# Patient Record
Sex: MALE | Employment: OTHER | URBAN - METROPOLITAN AREA
[De-identification: names, ages, dates, MRNs, and addresses within clinical notes are randomized per-mention and may not be internally consistent; named-entity substitution may affect disease eponyms.]

---

## 2017-01-31 PROBLEM — J32.1 CHRONIC FRONTAL SINUSITIS: Status: ACTIVE | Noted: 2017-01-31

## 2017-01-31 PROBLEM — J34.2 NASAL SEPTAL DEVIATION: Status: ACTIVE | Noted: 2017-01-31

## 2017-01-31 PROBLEM — K22.2 SCHATZKI'S RING OF DISTAL ESOPHAGUS: Status: ACTIVE | Noted: 2017-01-31

## 2017-01-31 PROBLEM — J32.2 CHRONIC ETHMOIDAL SINUSITIS: Status: ACTIVE | Noted: 2017-01-31

## 2017-01-31 PROBLEM — R13.14 PHARYNGOESOPHAGEAL DYSPHAGIA: Status: ACTIVE | Noted: 2017-01-31

## 2017-01-31 PROBLEM — J32.0 CHRONIC MAXILLARY SINUSITIS: Status: ACTIVE | Noted: 2017-01-31

## 2019-05-10 PROCEDURE — 87086 URINE CULTURE/COLONY COUNT: CPT | Performed by: PHYSICIAN ASSISTANT

## 2019-05-10 PROCEDURE — 81015 MICROSCOPIC EXAM OF URINE: CPT | Performed by: PHYSICIAN ASSISTANT

## 2019-06-11 PROBLEM — Z95.5 STATUS POST INSERTION OF DRUG-ELUTING STENT INTO LEFT ANTERIOR DESCENDING (LAD) ARTERY: Status: ACTIVE | Noted: 2019-06-11

## 2019-06-11 PROBLEM — I10 ESSENTIAL HYPERTENSION: Status: ACTIVE | Noted: 2019-06-11

## 2019-06-11 PROBLEM — I25.10 CORONARY ARTERY DISEASE INVOLVING NATIVE CORONARY ARTERY OF NATIVE HEART WITHOUT ANGINA PECTORIS: Status: ACTIVE | Noted: 2019-06-11

## 2019-06-11 PROBLEM — I50.22 CHRONIC SYSTOLIC HEART FAILURE (HCC): Status: ACTIVE | Noted: 2019-06-11

## 2019-06-18 PROBLEM — D64.9 ANEMIA: Status: ACTIVE | Noted: 2019-06-18

## 2019-07-02 PROBLEM — R31.0 GROSS HEMATURIA: Status: ACTIVE | Noted: 2019-07-02

## 2019-11-08 PROBLEM — I25.5 ISCHEMIC CARDIOMYOPATHY: Status: ACTIVE | Noted: 2019-11-08

## 2020-09-01 PROCEDURE — 88108 CYTOPATH CONCENTRATE TECH: CPT | Performed by: PHYSICIAN ASSISTANT

## 2020-10-01 PROCEDURE — 88305 TISSUE EXAM BY PATHOLOGIST: CPT | Performed by: INTERNAL MEDICINE

## 2020-12-01 PROBLEM — Z87.448 HISTORY OF GROSS HEMATURIA: Status: ACTIVE | Noted: 2020-12-01

## 2020-12-01 PROBLEM — Z87.898 HISTORY OF GROSS HEMATURIA: Status: ACTIVE | Noted: 2020-12-01

## 2021-03-03 PROBLEM — I77.1 TORTUOUS AORTA (HCC): Status: ACTIVE | Noted: 2021-03-03

## 2021-03-03 PROBLEM — J32.0 CHRONIC MAXILLARY SINUSITIS: Status: RESOLVED | Noted: 2017-01-31 | Resolved: 2021-03-03

## 2021-03-03 PROBLEM — J32.2 CHRONIC ETHMOIDAL SINUSITIS: Status: RESOLVED | Noted: 2017-01-31 | Resolved: 2021-03-03

## 2021-03-03 PROBLEM — R13.14 PHARYNGOESOPHAGEAL DYSPHAGIA: Status: RESOLVED | Noted: 2017-01-31 | Resolved: 2021-03-03

## 2021-03-03 PROBLEM — I77.1 TORTUOUS AORTA: Status: ACTIVE | Noted: 2021-03-03

## 2021-03-03 PROBLEM — E66.01 OBESITY, MORBID (HCC): Status: ACTIVE | Noted: 2021-03-03

## 2021-03-03 PROBLEM — E66.812 CLASS 2 SEVERE OBESITY DUE TO EXCESS CALORIES WITH SERIOUS COMORBIDITY AND BODY MASS INDEX (BMI) OF 36.0 TO 36.9 IN ADULT (HCC): Status: ACTIVE | Noted: 2021-03-03

## 2021-03-03 PROBLEM — R31.0 GROSS HEMATURIA: Status: RESOLVED | Noted: 2019-07-02 | Resolved: 2021-03-03

## 2021-03-03 PROBLEM — Z87.448 HISTORY OF GROSS HEMATURIA: Status: RESOLVED | Noted: 2020-12-01 | Resolved: 2021-03-03

## 2021-03-03 PROBLEM — I26.99 PE (PULMONARY THROMBOEMBOLISM) (HCC): Status: ACTIVE | Noted: 2021-03-03

## 2021-03-03 PROBLEM — E66.01 CLASS 2 SEVERE OBESITY DUE TO EXCESS CALORIES WITH SERIOUS COMORBIDITY AND BODY MASS INDEX (BMI) OF 36.0 TO 36.9 IN ADULT (HCC): Status: ACTIVE | Noted: 2021-03-03

## 2021-03-03 PROBLEM — Z87.898 HISTORY OF GROSS HEMATURIA: Status: RESOLVED | Noted: 2020-12-01 | Resolved: 2021-03-03

## 2021-03-03 PROBLEM — J32.1 CHRONIC FRONTAL SINUSITIS: Status: RESOLVED | Noted: 2017-01-31 | Resolved: 2021-03-03

## 2021-03-03 PROBLEM — E66.01 CLASS 2 SEVERE OBESITY DUE TO EXCESS CALORIES WITH SERIOUS COMORBIDITY AND BODY MASS INDEX (BMI) OF 36.0 TO 36.9 IN ADULT: Status: ACTIVE | Noted: 2021-03-03

## 2022-03-04 PROBLEM — E66.01 OBESITY, MORBID (HCC): Status: RESOLVED | Noted: 2021-03-03 | Resolved: 2022-03-04

## 2022-03-04 PROBLEM — E66.9 OBESITY (BMI 30-39.9): Status: ACTIVE | Noted: 2022-03-04

## 2022-03-04 PROBLEM — I70.8 AORTO-ILIAC ATHEROSCLEROSIS: Status: ACTIVE | Noted: 2022-03-04

## 2022-03-04 PROBLEM — I70.0 AORTO-ILIAC ATHEROSCLEROSIS: Status: ACTIVE | Noted: 2022-03-04

## 2022-03-10 PROBLEM — I11.0 HYPERTENSIVE HEART DISEASE WITH CONGESTIVE HEART FAILURE (HCC): Status: ACTIVE | Noted: 2022-03-10

## 2022-06-02 RX ORDER — TAMSULOSIN HYDROCHLORIDE 0.4 MG/1
CAPSULE ORAL
Qty: 90 CAPSULE | Refills: 3 | OUTPATIENT
Start: 2022-06-02

## 2025-01-24 NOTE — H&P
[facility]  Division of Cardiology  Updated H&P       Patient Name: Adin Salcido  MRN: [id number]  CSN: [id number]  YOB: 1950    Diagnosis: CAD, abnormal stress    Present Illness: Symptoms, CAD, abnormal stress test.  See EMR.    Interval change: None.    Home Medications:  Prescriptions Prior to Admission[1]    Allergies: Allergies[2]    History:  Past Medical History:    Allergic rhinitis    Arthritis    BPH (benign prostatic hyperplasia)    Chronic maxillary sinusitis    Resolved last addressed  11/7/17    Essential hypertension    Hematuria    History of adverse reaction to anesthesia    hallucinations/sensory overload with deep sedation    Nephrolithiasis    DANDY (obstructive sleep apnea)    AHI 30 RDI 37 REM AHI 54 Supine AHI 44 non-supine AHI 23 Sao2 [place] 83%/CPAP-12cwp/[facility]    Osteoarthrosis, unspecified whether generalized or localized, ankle and foot    Log Date: 06/02/2011     Osteoarthrosis, unspecified whether generalized or localized, lower leg    Log Date: 05/09/2013     Pharyngoesophageal dysphagia    Resolved last addressed  11/7/17     Sleep apnea    Good louise PSG  4/2012 AHI 45, cpap 15    Trigger finger (acquired)    .Log Date: 05/06/2013 Resolved last addressed  10/28/13      Past Surgical History:   Procedure Laterality Date    Biopsy of prostate,incisional      Colonoscopy  age 61    Cystourethroscopy,biopsy  05/09/2019    bladder mass - [facility] pathology proved to be prostate regrowth - [facility]    Egd N/A 10/1/2020    Procedure: ESOPHAGOGASTRODUODENOSCOPY, COLONOSCOPY, POSSIBLE BIOPSY, POSSIBLE POLYPECTOMY 15929, 33091;  Surgeon: Selvin Gray MD;  Location: [facility] SURGICAL [facility]    Hand/finger surgery unlisted      trigger release    Lithotripsy  2013    Dr. Baird    Other surgical history      nose and jaw surgery 2016    Other surgical history  09/03/2020    Cystoscopy, Dr Yarbrough    Sinus surgery        Transurethral elec-surg  prostatectom  04/20/2012    FILI Baird     Social History     Tobacco Use    Smoking status: Never    Smokeless tobacco: Never   Substance Use Topics    Alcohol use: Yes     Alcohol/week: 0.0 standard drinks of alcohol     Family History   Problem Relation Age of Onset    Cancer Father     Ear Problems Mother     Cancer Mother     Allergies Neg        Objective:  Vitals:    02/07/25 1015   BP: 137/79   Pulse: 84   Resp: 16   Temp: 96.9 °F (36.1 °C)       Exam:  General: NAD  Neck: No JVD  Lungs: CTA bilat  Heart: RRR, S1, S2  Abdomen: Soft, NT/ND, BS+x4  Extremities: Warm, dry, no LE edema bilat  Pulses: 2+ bilat DP  Skin: no rashes or legions noted  Neurological:  AAOx3, MAEW    Labs:             Plan:  Cardiac catheterization, coronary angio, +/- PCI.  Further recommendations after above.    Informed Consent:  I've discussed the procedure at length with the patient including the risks, benefits, and alternatives.  They wish to proceed.  I've reviewed the H&P and any changes within the last 30 days are noted above.    Thank you for allowing our group to care for your patient. Please contact me with any questions!   Fransisco Zhou MD  Ph [phone]                [1]   Medications Prior to Admission   Medication Sig Dispense Refill Last Dose/Taking    losartan 50 MG Oral Tab Take 1 tablet (50 mg total) by mouth daily.   2/7/2025 Morning    traZODone 50 MG Oral Tab Take by mouth nightly. 50-100mg   2/6/2025    sertraline 50 MG Oral Tab Take 1 tablet (50 mg total) by mouth daily.   2/6/2025    albuterol 108 (90 Base) MCG/ACT Inhalation Aero Soln Inhale 2 puffs into the lungs every 6 (six) hours as needed for Wheezing.   Past Month    ezetimibe 10 MG Oral Tab Take 1 tablet (10 mg total) by mouth nightly.   2/7/2025 Morning    omeprazole 20 MG Oral Capsule Delayed Release Take 1 capsule (20 mg total) by mouth before breakfast. 90 capsule 3 2/7/2025 Morning    rosuvastatin 40 MG Oral Tab TAKE 1 TABLET EVERY NIGHT 90  tablet 3 2/7/2025 Morning    metoprolol succinate ER 50 MG Oral Tablet 24 Hr Take 1 tablet (50 mg total) by mouth once daily. 90 tablet 3 2/7/2025 Morning    FINASTERIDE 5 MG Oral Tab TAKE 1 TABLET EVERY DAY 90 tablet 3 2/7/2025 Morning    aspirin EC 81 MG Oral Tab EC Take 1 tablet (81 mg total) by mouth daily. 90 tablet 3 2/7/2025 Morning   [2]   Allergies  Allergen Reactions    Penicillin G RASH

## 2025-01-30 RX ORDER — ALBUTEROL SULFATE 90 UG/1
2 INHALANT RESPIRATORY (INHALATION) EVERY 6 HOURS PRN
COMMUNITY

## 2025-01-30 RX ORDER — EZETIMIBE 10 MG/1
10 TABLET ORAL NIGHTLY
COMMUNITY

## 2025-01-31 ENCOUNTER — HOSPITAL ENCOUNTER (OUTPATIENT)
Dept: INTERVENTIONAL RADIOLOGY/VASCULAR | Facility: HOSPITAL | Age: 75
Discharge: HOME | End: 2025-01-31
Attending: INTERNAL MEDICINE
Payer: MEDICARE

## 2025-02-03 RX ORDER — TRAZODONE HYDROCHLORIDE 50 MG/1
TABLET ORAL NIGHTLY
COMMUNITY

## 2025-02-03 RX ORDER — LOSARTAN POTASSIUM 50 MG/1
50 TABLET ORAL DAILY
COMMUNITY

## 2025-02-03 NOTE — PAT NURSING NOTE
Per PAT encounter/MyChart message sent to pt/took notes as well:    PreOp Instructions     You are scheduled for: a Cardiac Procedure     Date of Procedure: 02/07/25 Friday     Diet Instructions: Do not eat or drink anything after midnight including gum, mints, candy, etc.     Medications: Medications you are allowed to take can be taken with a sip of water the morning of your procedure, Take Aspirin 81 mg x 4 tablets the day of your procedure     Medications to Stop: Hold herbal supplements and vitamins    Sleep Apnea: If you have sleep apnea, please bring your mask and tubing     Skin Prep : Shower with antibacterial soap using a clean washcloth, prior to procedure. Once dried off, no lotions/powders/creams/ointments, etc., Do not shave the procedure area, this will be completed at the hospital during the preparation phase.     Arrival Time: The day prior to your procedure (Thursday) you will receive a phone call before 6:00 pm with your arrival time. If you haven't received a phone call, please check your voicemail messages., If you did not receive a voice mail and it is after 6:00 pm, please call the nursing supervisor at [phone].    Driving After Procedure: Sedation will be given so you WILL NOT be able to drive home. You will need a responsible adult  to drive you home. You can NOT take uber or taxi unless approved by your physician in advance.     Discharge Teaching: Your nurse will give you specific instructions before discharge, Most people can resume normal activities in 2-3 days, Any questions, please call the physician's office     [employer] parking is available starting at 6 am or [facility]. Check in at the [facility] reception desk. Our  will be there to check you in for your procedure. Please bring your insurance cards and ID with you.                                                                                                                                       Please DO NOT respond to this message, the inbasket is not monitored for messages. For any questions, please call the physician's office.

## 2025-02-07 ENCOUNTER — HOSPITAL ENCOUNTER (OUTPATIENT)
Dept: INTERVENTIONAL RADIOLOGY/VASCULAR | Facility: HOSPITAL | Age: 75
Discharge: HOME | End: 2025-02-07
Attending: INTERNAL MEDICINE | Admitting: INTERNAL MEDICINE
Payer: MEDICARE

## 2025-02-07 VITALS
BODY MASS INDEX: 38.41 KG/M2 | OXYGEN SATURATION: 97 % | HEART RATE: 71 BPM | RESPIRATION RATE: 20 BRPM | HEIGHT: 64 IN | SYSTOLIC BLOOD PRESSURE: 110 MMHG | DIASTOLIC BLOOD PRESSURE: 59 MMHG | TEMPERATURE: 97 F | WEIGHT: 225 LBS

## 2025-02-07 DIAGNOSIS — R94.39 ABNORMAL STRESS TEST: ICD-10-CM

## 2025-02-07 PROCEDURE — B2151ZZ FLUOROSCOPY OF LEFT HEART USING LOW OSMOLAR CONTRAST: ICD-10-PCS | Performed by: INTERNAL MEDICINE

## 2025-02-07 PROCEDURE — 93458 L HRT ARTERY/VENTRICLE ANGIO: CPT | Performed by: INTERNAL MEDICINE

## 2025-02-07 PROCEDURE — B2111ZZ FLUOROSCOPY OF MULTIPLE CORONARY ARTERIES USING LOW OSMOLAR CONTRAST: ICD-10-PCS | Performed by: INTERNAL MEDICINE

## 2025-02-07 PROCEDURE — 4A023N7 MEASUREMENT OF CARDIAC SAMPLING AND PRESSURE, LEFT HEART, PERCUTANEOUS APPROACH: ICD-10-PCS | Performed by: INTERNAL MEDICINE

## 2025-02-07 PROCEDURE — 99152 MOD SED SAME PHYS/QHP 5/>YRS: CPT | Performed by: INTERNAL MEDICINE

## 2025-02-07 RX ORDER — MIDAZOLAM HYDROCHLORIDE 1 MG/ML
INJECTION INTRAMUSCULAR; INTRAVENOUS
Status: COMPLETED
Start: 2025-02-07 | End: 2025-02-07

## 2025-02-07 RX ORDER — HEPARIN SODIUM 5000 [USP'U]/ML
INJECTION, SOLUTION INTRAVENOUS; SUBCUTANEOUS
Status: COMPLETED
Start: 2025-02-07 | End: 2025-02-07

## 2025-02-07 RX ORDER — SODIUM CHLORIDE 9 MG/ML
INJECTION, SOLUTION INTRAVENOUS
Status: COMPLETED | OUTPATIENT
Start: 2025-02-07 | End: 2025-02-07

## 2025-02-07 RX ORDER — VERAPAMIL HYDROCHLORIDE 2.5 MG/ML
INJECTION, SOLUTION INTRAVENOUS
Status: COMPLETED
Start: 2025-02-07 | End: 2025-02-07

## 2025-02-07 RX ORDER — LIDOCAINE HYDROCHLORIDE 10 MG/ML
INJECTION, SOLUTION EPIDURAL; INFILTRATION; INTRACAUDAL; PERINEURAL
Status: COMPLETED
Start: 2025-02-07 | End: 2025-02-07

## 2025-02-07 RX ORDER — NITROGLYCERIN 20 MG/100ML
INJECTION INTRAVENOUS
Status: COMPLETED
Start: 2025-02-07 | End: 2025-02-07

## 2025-02-07 RX ORDER — IOPAMIDOL 755 MG/ML
200 INJECTION, SOLUTION INTRAVASCULAR
Status: COMPLETED | OUTPATIENT
Start: 2025-02-07 | End: 2025-02-07

## 2025-02-07 RX ORDER — DIPHENHYDRAMINE HYDROCHLORIDE 50 MG/ML
INJECTION INTRAMUSCULAR; INTRAVENOUS
Status: DISCONTINUED
Start: 2025-02-07 | End: 2025-02-07 | Stop reason: WASHOUT

## 2025-02-07 RX ADMIN — SODIUM CHLORIDE: 9 INJECTION, SOLUTION INTRAVENOUS at 10:20:00

## 2025-02-07 RX ADMIN — IOPAMIDOL 100 ML: 755 INJECTION, SOLUTION INTRAVASCULAR at 12:25:00

## 2025-02-07 NOTE — PROCEDURES
[facility] Division of Cardiology   Cardiac Catheterization & Percutaneous Coronary Intervention     Adin Salcido Location: [facility] Cath Lab    CSN [id number] MRN [id number]   Admission Date 2/7/2025 Procedure Date 2/7/2025   Attending Physician No att. providers found Procedure Physician Fransisco Zhou MD     PREOPERATIVE DIAGNOSIS:  Abnormal stress  POSTOPERATIVE DIAGNOSIS:  CAD  PROCEDURE PERFORMED:  Coronary angio, LHC, LVG      PROCEDURE:    Moderate sedation: The patient was brought to the cardiac catheterization lab in the fasting state.  Informed consent was previously obtained.  Moderate sedation was employed using 3mg IV Versed and 75mcg IV Fentanyl.  I directly observed the patient from 1212 to 1237, watching the heart rate, blood pressure, oximetry, and rhythm.  An independent, trained observer was present throughout the procedure and assisted in the monitoring of the patient's level of consciousness and physiologic states.  Please see the Merge Catheterization Report (MCRTM) in [facility] for further, technical details.  Vascular access and catheter placement:  A 6F right femoral, slender sheath was utilized after micropuncture access gained.  A JL4 catheter was used for LM engagement and angiography and a JR4 was used for  RCA engagement and angiography.  Standard over the wire technique was utilized.  DORA and JASSO angiographic views with caudal and cranial angulation were used for cineangiography.  I used a pigtail catheter to cross the aortic valve and measure LVEDP and the transaortic pullback gradient.  We performed a contrast ventriculogram. Hemodynamics were measured in the standard fashion using fluid filled, pressure manometry via the ASSISTTM device and analyzed with the [employer] catheterization software.  At the end of the case, a PercloseTM device was used for arterial hemostasis and our standard protocol was followed.     DIAGNOSTIC FINDINGS:    Left heart  catheterization:  Left ventricular end-diastolic pressure (LVEDP) was 16mmHg.    LV ejection fraction was approximately  55% with no significant angiographic mitral regurgitation; no diagnostic regional wall motion abnormalities were noted.  No significant transaortic gradient by pullback was measured.    Coronary angiography:    LMCA: The left main artery is large with distal calcific plaque noted.  LAD:  The left anterior descending artery is large with patent stents in the mid vessel with no sig ISR. There is moderate (30-40%) plaquing noted.  LCX: The left circumflex artery is moderate system at most.  There are 3 small OM branches.  OM 3 is the largest of those and is is < 2mm out of the AV groove.  The ostial CX is 90% and encased in calcium extending from the distal LM into the ostial CX.     RCA:  The right coronary artery is very large, very dominant artery that supplies the inferior and lateral wall and runs past the apex onto the anterolateral wall.  It has mild to at most moderate disease.    MEDICATIONS:  See nursing records, MCR, and EMR.     COMPLICATIONS:  None.     IMPRESSION:    CAD.  Patent LAD stents with no sig ISR.  90% ostial CX stenosis extending from very calcific lesion out of distal left main which supplies a limited territory.  Otherwise mild to moderate disease/plaques with a VERY LARGE RCA supplying the inferior and lateral territories.  Normal LV size and EF 55%.    RECOMMENDATIONS:   Medically manage CAD.  Follow up in cardiology clinic with myself or APN in 2-3 weeks to review meds and risk factors.  Do not feel that high risk PCI (which would necessitate involvement of LAD/LM and CX) to perfuse the limited latereal territory supplied by the CX is high yield.  The RCA provides sufficient supply.  This area was not ischemic by nuclear.  I would proceed with medical management and only consider interventional and surgical options if limiting symptoms persist despite maximal medical  therapy.    Fransisco Zhou MD  General, Interventional  Structural & Endovascular  Cardiology

## 2025-02-07 NOTE — PROGRESS NOTES
Pt received s/p C with Dr. Zhou. Right femoral arterial access site closed with perclose device. Site CDI, no bleeding or hematoma present. Dr. Zhou at bedside and spoke with pt and pt's wife. Recovery complete. Discharge instructions given to pt and pt's wife. IV discontinued, pt taken down to [facility] via wheelchair for discharge.

## 2025-03-27 ENCOUNTER — APPOINTMENT (OUTPATIENT)
Age: 75
End: 2025-03-27
Attending: INTERNAL MEDICINE
Payer: MEDICARE